# Patient Record
Sex: FEMALE | Race: WHITE | NOT HISPANIC OR LATINO
[De-identification: names, ages, dates, MRNs, and addresses within clinical notes are randomized per-mention and may not be internally consistent; named-entity substitution may affect disease eponyms.]

---

## 2023-12-11 ENCOUNTER — NON-APPOINTMENT (OUTPATIENT)
Age: 78
End: 2023-12-11

## 2023-12-11 DIAGNOSIS — Z86.79 PERSONAL HISTORY OF OTHER DISEASES OF THE CIRCULATORY SYSTEM: ICD-10-CM

## 2023-12-11 DIAGNOSIS — Z86.39 PERSONAL HISTORY OF OTHER ENDOCRINE, NUTRITIONAL AND METABOLIC DISEASE: ICD-10-CM

## 2023-12-11 DIAGNOSIS — Z82.49 FAMILY HISTORY OF ISCHEMIC HEART DISEASE AND OTHER DISEASES OF THE CIRCULATORY SYSTEM: ICD-10-CM

## 2023-12-11 DIAGNOSIS — K62.3 RECTAL PROLAPSE: ICD-10-CM

## 2023-12-11 DIAGNOSIS — Z81.8 FAMILY HISTORY OF OTHER MENTAL AND BEHAVIORAL DISORDERS: ICD-10-CM

## 2023-12-11 DIAGNOSIS — Z85.42 PERSONAL HISTORY OF MALIGNANT NEOPLASM OF OTHER PARTS OF UTERUS: ICD-10-CM

## 2023-12-11 PROBLEM — Z00.00 ENCOUNTER FOR PREVENTIVE HEALTH EXAMINATION: Status: ACTIVE | Noted: 2023-12-11

## 2023-12-11 RX ORDER — ZOLPIDEM TARTRATE 5 MG/1
5 TABLET ORAL
Refills: 0 | Status: ACTIVE | COMMUNITY

## 2023-12-11 RX ORDER — INSULIN ASPART 100 [IU]/ML
INJECTION, SOLUTION INTRAVENOUS; SUBCUTANEOUS
Refills: 0 | Status: ACTIVE | COMMUNITY

## 2023-12-11 RX ORDER — ATORVASTATIN CALCIUM 80 MG/1
80 TABLET, FILM COATED ORAL
Refills: 0 | Status: ACTIVE | COMMUNITY

## 2023-12-11 RX ORDER — LEVOTHYROXINE SODIUM 0.07 MG/1
75 TABLET ORAL
Refills: 0 | Status: ACTIVE | COMMUNITY

## 2023-12-11 RX ORDER — PANTOPRAZOLE 40 MG/1
40 TABLET, DELAYED RELEASE ORAL
Refills: 0 | Status: ACTIVE | COMMUNITY

## 2023-12-11 RX ORDER — LISINOPRIL 10 MG/1
10 TABLET ORAL
Refills: 0 | Status: ACTIVE | COMMUNITY

## 2023-12-11 RX ORDER — LEVOTHYROXINE SODIUM 0.05 MG/1
50 TABLET ORAL
Refills: 0 | Status: ACTIVE | COMMUNITY

## 2023-12-19 ENCOUNTER — NON-APPOINTMENT (OUTPATIENT)
Age: 78
End: 2023-12-19

## 2023-12-20 ENCOUNTER — APPOINTMENT (OUTPATIENT)
Dept: COLORECTAL SURGERY | Facility: CLINIC | Age: 78
End: 2023-12-20
Payer: MEDICARE

## 2023-12-20 VITALS
SYSTOLIC BLOOD PRESSURE: 158 MMHG | DIASTOLIC BLOOD PRESSURE: 82 MMHG | HEART RATE: 71 BPM | WEIGHT: 118 LBS | BODY MASS INDEX: 23.79 KG/M2 | HEIGHT: 59 IN

## 2023-12-20 DIAGNOSIS — N81.89 OTHER FEMALE GENITAL PROLAPSE: ICD-10-CM

## 2023-12-20 DIAGNOSIS — R19.7 DIARRHEA, UNSPECIFIED: ICD-10-CM

## 2023-12-20 PROCEDURE — 99204 OFFICE O/P NEW MOD 45 MIN: CPT

## 2023-12-20 NOTE — ASSESSMENT
[FreeTextEntry1] : 78 F, s/p mesh rectopexy at an OSH. Consulting us for prolapsed hemorrhoid, anal leakage/mucus discharge, night time stooling. On exam, she has a very weak anal sphincter, a prolapsed inflammatory anal polyp. Plan: Stool for culture. Refer to PT for anal sphincter exercises/biofeedback. Discussed surgery for the anal polyp; she will call us to schedule. Consider changing her meal pattern (evening dinner); to consult with her endocrinologist and nutritionist. See again as needed. All questions answered.

## 2023-12-20 NOTE — REASON FOR VISIT
[Initial Evaluation] : an initial evaluation [Spouse] : spouse [Consultation] : a consultation visit

## 2023-12-20 NOTE — HISTORY OF PRESENT ILLNESS
[FreeTextEntry1] : 78-year-old female pt here with complaints of prolapsed colon. PMH- HTN, HLD, DM, GUICHO, BSO, and pelvic lymphadenectomy and repair of cystotomy for uterine cancer and fibroids 12 years ago. (Uterine cancer stage I). 1 episode of diverticulitis in 2019 treated with oral antibiotic.  Saw Dr. Anum Renner from [facility] 1/6/22 for full thickness rectal prolapse and recommended Robotic ventral mesh rectopexy. Also referred her to UroGyn.  Was told there was a slight urinary prolapse and didn't need surgery.  Colonoscopy 6/25/19 Hemorrhoids Diverticulosis Diminutive polyp 2.5mm removed. Repeat in 5 years.  Pathology Rectal polyp- inflammatory pseudopolyp, negative for dysplasia  1 year ago had surgery for a prolasped colon with Dr. Naveed Cary 12/15/22- Robotic ventral mesh rectopexy, flexible sigmoidoscopy  Was given the impression the surgery would last 10 years  2 months ago just came back from a 3 week vacation where she was doing a lot of walking noticed changes in her body wake up between 12MN - 5am where she has to have a BM and had some episodes of incontinence. also had hemorrhoids that didn't bother her when she was travelling but since she has been home they are bothering her Feels more pressure in her rectum.  Doesn't know when she is walking if it's the hemorrhoids or the prolapse. Had a follow up after the surgery and had anal manometry testing done in April and was told she had a weak anal sphincter and supposed to have pelvic floor therapy with biofeedback and was unable to schedule the therapy because no one got back to her.  Very frustrated with no follow up from the surgeon or physical therapy Also recommended she do Kegels which she has been doing.  Reports occasional rectal bleeding on the tissue and mucus secretions from the rectum Has to wear a pad because of the secretion from the rectum Had the secretions before surgery that got better initially after surgery but now has returned and gotten worse.

## 2023-12-20 NOTE — PHYSICAL EXAM
[Abdomen Masses] : No abdominal masses [Abdomen Tenderness] : ~T No ~M abdominal tenderness [No HSM] : no hepatosplenomegaly [No Rash or Lesion] : No rash or lesion [Alert] : alert [Oriented to Person] : oriented to person [Oriented to Place] : oriented to place [Oriented to Time] : oriented to time [Calm] : calm [de-identified] : Soft [de-identified] : External - prolapsed inflammatory anal polyp, anal skin tags, patulous anus; NITZA - poor anal restione, very weak anal squeeze pressure [de-identified] : Normal [de-identified] : Normal [de-identified] : Normal [de-identified] : Normal [de-identified] : Normal

## 2023-12-21 ENCOUNTER — TRANSCRIPTION ENCOUNTER (OUTPATIENT)
Age: 78
End: 2023-12-21

## 2023-12-21 ENCOUNTER — NON-APPOINTMENT (OUTPATIENT)
Age: 78
End: 2023-12-21

## 2023-12-28 ENCOUNTER — TRANSCRIPTION ENCOUNTER (OUTPATIENT)
Age: 78
End: 2023-12-28

## 2024-01-08 ENCOUNTER — NON-APPOINTMENT (OUTPATIENT)
Age: 79
End: 2024-01-08

## 2024-03-07 ENCOUNTER — TRANSCRIPTION ENCOUNTER (OUTPATIENT)
Age: 79
End: 2024-03-07

## 2024-03-13 ENCOUNTER — APPOINTMENT (OUTPATIENT)
Dept: COLORECTAL SURGERY | Facility: CLINIC | Age: 79
End: 2024-03-13
Payer: MEDICARE

## 2024-03-13 VITALS
HEART RATE: 72 BPM | BODY MASS INDEX: 23.59 KG/M2 | WEIGHT: 117 LBS | TEMPERATURE: 98 F | RESPIRATION RATE: 16 BRPM | DIASTOLIC BLOOD PRESSURE: 80 MMHG | SYSTOLIC BLOOD PRESSURE: 152 MMHG | HEIGHT: 59 IN | OXYGEN SATURATION: 99 %

## 2024-03-13 PROCEDURE — 99215 OFFICE O/P EST HI 40 MIN: CPT

## 2024-03-13 NOTE — ASSESSMENT
[FreeTextEntry1] : 78 F, s/p mesh rectopexy at an OSH. Following up today with pictures showing a non circumferential full thickness rectal prolapse. She also has a prolapsed hemorrhoid, anal leakage/mucus discharge, night time stooling. On exam, she has a very weak anal sphincter, a prolapsed inflammatory anal polyp. Plan: Discussed surgery for the rectal prolapse and anal polyp (likely perineal approach); she will call us to schedule. All questions answered.

## 2024-03-13 NOTE — HISTORY OF PRESENT ILLNESS
[FreeTextEntry1] : 78-year-old female pt last seen 12/20/23. S/p mesh rectopexy at an OSH. Consulting us for prolapsed hemorrhoid and anal leakage/mucus, nighttime soiling. Pt was found to have very weak anal sphincter and was referred for PT for exercise and biofeedback.  Here for follow up.  Pt reported having 5 PT sessions but her and therapist, Latanya decided to hold off on any more PT because it appears to be exacerbating her symptoms.  Any exercise, walking in particular does 2-3 miles 2-3 times a week and she experiences a prolapse Some days it will just come out on its own. Notices a little bit of blood when it prolapses Has not tried to push it back in.  When she sits it goes back in very slowly Pt reports it's very uncomfortable when it prolapses. Moves her bowels regularly and it prolapses just a little with BM Reports that when it prolapses she had a lot of difficulty urinating and very little urine comes out  Leaving 4/29/24 for 3 week [place] tour with a lot of walking and if she needs a procedure she's like it done sooner than later to allow for healing.  Last colonoscopy 6/25/19 Mild diminutive polyp 2.5 mm removed Diverticulosis External hemorrhoids Pathology: inflammatory pseudopolyp negative for dysplasia Repeat in 5 years  Pt reports that she rather not have a colonoscopy as her sugar drops and she has a vasovagal response to the prep.

## 2024-03-13 NOTE — PHYSICAL EXAM
[Abdomen Masses] : No abdominal masses [Abdomen Tenderness] : ~T No ~M abdominal tenderness [No HSM] : no hepatosplenomegaly [No Rash or Lesion] : No rash or lesion [Alert] : alert [Oriented to Person] : oriented to person [Oriented to Time] : oriented to time [Oriented to Place] : oriented to place [Calm] : calm [de-identified] : External - prolapsed inflammatory anal polyp, anal skin tags, patulous anus; NITZA - poor anal restione, very weak anal squeeze pressure [de-identified] : Soft [de-identified] : Normal [de-identified] : Normal [de-identified] : Normal [de-identified] : Normal [de-identified] : Normal